# Patient Record
Sex: FEMALE | Race: ASIAN | URBAN - METROPOLITAN AREA
[De-identification: names, ages, dates, MRNs, and addresses within clinical notes are randomized per-mention and may not be internally consistent; named-entity substitution may affect disease eponyms.]

---

## 2017-02-11 PROBLEM — R42 VERTIGO: Status: ACTIVE | Noted: 2017-02-11

## 2017-02-11 PROBLEM — Z78.9 VEGETARIAN: Status: ACTIVE | Noted: 2017-02-11

## 2017-02-11 PROCEDURE — 82746 ASSAY OF FOLIC ACID SERUM: CPT | Performed by: INTERNAL MEDICINE

## 2017-02-11 PROCEDURE — 82607 VITAMIN B-12: CPT | Performed by: INTERNAL MEDICINE

## 2017-09-08 PROBLEM — E03.9 ACQUIRED HYPOTHYROIDISM: Status: ACTIVE | Noted: 2017-09-08

## 2019-04-19 PROCEDURE — 88175 CYTOPATH C/V AUTO FLUID REDO: CPT | Performed by: INTERNAL MEDICINE

## 2021-05-04 LAB
ABO + RH BLD: NORMAL
BLD GP AB SCN SERPL QL GEL: NEGATIVE
HBV SURFACE AG SER QL: NEGATIVE
HIV 1+2 AB+HIV1 P24 AG SERPL QL IA: NEGATIVE
RPR SER QL: NON REACTIVE
RUBV IGG SERPL IA-ACNC: NORMAL

## 2021-06-09 ENCOUNTER — ULTRASOUND ENCOUNTER (OUTPATIENT)
Dept: PERINATAL CARE | Facility: HOSPITAL | Age: 36
End: 2021-06-09
Attending: OBSTETRICS & GYNECOLOGY
Payer: COMMERCIAL

## 2021-06-09 VITALS
WEIGHT: 167 LBS | HEIGHT: 60 IN | HEART RATE: 94 BPM | BODY MASS INDEX: 32.79 KG/M2 | DIASTOLIC BLOOD PRESSURE: 69 MMHG | SYSTOLIC BLOOD PRESSURE: 102 MMHG

## 2021-06-09 DIAGNOSIS — O99.282 HYPOTHYROIDISM AFFECTING PREGNANCY IN SECOND TRIMESTER: ICD-10-CM

## 2021-06-09 DIAGNOSIS — O09.522 MULTIGRAVIDA OF ADVANCED MATERNAL AGE IN SECOND TRIMESTER: ICD-10-CM

## 2021-06-09 DIAGNOSIS — O99.212 OBESITY AFFECTING PREGNANCY IN SECOND TRIMESTER: ICD-10-CM

## 2021-06-09 DIAGNOSIS — E03.9 HYPOTHYROIDISM AFFECTING PREGNANCY IN SECOND TRIMESTER: ICD-10-CM

## 2021-06-09 DIAGNOSIS — O09.522 MULTIGRAVIDA OF ADVANCED MATERNAL AGE IN SECOND TRIMESTER: Primary | ICD-10-CM

## 2021-06-09 PROCEDURE — 76811 OB US DETAILED SNGL FETUS: CPT | Performed by: OBSTETRICS & GYNECOLOGY

## 2021-06-09 PROCEDURE — 99243 OFF/OP CNSLTJ NEW/EST LOW 30: CPT | Performed by: OBSTETRICS & GYNECOLOGY

## 2021-06-09 NOTE — PROGRESS NOTES
[facility]    Dear Dr. Maeve Barajas    Thank you for requesting ultrasound evaluation and maternal fetal medicine consultation on your patient Bia Damico.   As you are aware she is a 30year old female  with a sin Mental Status: She is alert and oriented to person, place, and time. Psychiatric:         Mood and Affect: Mood normal.         Behavior: Behavior normal.         Thought Content:  Thought content normal.         Judgment: Judgment normal.   Vitals and more than adequate to assess for gestational diabetes and preeclampsia; hence, no further significant alterations in obstetric care are advised.     Medical Complications    Women 28years of age or older can expect to experience two to three fold higher ra women 35-39 years and at 26 weeks for women 40 years and older. Fetal Malformations    Cardiac malformations, clubfoot, and diaphragmatic hernia appear to occur with increased frequency in offspring of older women.  These abnormalities are structural and continue with her negative first trimester serum screen. If they want to pursue a noninvasive prenatal screen, they could do that as well. They declined invasive testing.     OBESITY:  Her BMI prior to pregnancy was 33  Obesity during pregnancy is associa weight and BMI are independent risk factors for preeclampsia.              Studies have found that the increased risk of  birth in obese gravidas is primarily associated with obesity-related medical and  complications, rather than an intrins risk of the disease. As opposed to higher dose aspirin therapy, low-dose aspirin (60 to 150 mg per day) diminishes platelet thromboxane synthesis while maintaining vascular wall prostacyclin synthesis.  Although not well studied, the beneficial effect of lo high risk is subjective and determined case by case, as the data describing the magnitude of the association between each of these risk factors and development of preeclampsia vary widely and lack consistency.  USPSTF criteria for moderate risk include:  · gastroschisis). Early therapy (before 16 weeks) may be important since the pathophysiologic features of preeclampsia develop at this time, weeks before clinical disease is apparent.  However, available evidence is inconsistent about the importance of early and gestational hypertension  ? Placental abruption  ? Nonreassuring fetal heart rate tracing  ?  delivery, including very  delivery (before 32 weeks)  ? Low birth weight  ? Increased rate of  section  ?  morbidity and mortality  ? GDMA1  · Hypothyroidism    RECOMMENDATIONS:  · Continue care with Dr. Angela Arriaga TSH every 4 to 6 weeks  Follow-up Growth ultrasound at 32 weeks. Weekly NST's at 36 weeks.   Undecided on aspirin therapy to prevent preeclampsia  She is considering NIPT

## 2021-08-25 ENCOUNTER — OFFICE VISIT (OUTPATIENT)
Dept: PERINATAL CARE | Facility: HOSPITAL | Age: 36
End: 2021-08-25
Attending: OBSTETRICS & GYNECOLOGY
Payer: COMMERCIAL

## 2021-08-25 VITALS
WEIGHT: 176 LBS | DIASTOLIC BLOOD PRESSURE: 65 MMHG | HEART RATE: 94 BPM | BODY MASS INDEX: 34 KG/M2 | SYSTOLIC BLOOD PRESSURE: 102 MMHG

## 2021-08-25 DIAGNOSIS — O09.522 MULTIGRAVIDA OF ADVANCED MATERNAL AGE IN SECOND TRIMESTER: Primary | ICD-10-CM

## 2021-08-25 DIAGNOSIS — O99.282 HYPOTHYROIDISM AFFECTING PREGNANCY IN SECOND TRIMESTER: ICD-10-CM

## 2021-08-25 DIAGNOSIS — E03.9 HYPOTHYROIDISM AFFECTING PREGNANCY IN SECOND TRIMESTER: ICD-10-CM

## 2021-08-25 DIAGNOSIS — E66.9 CLASS 1 OBESITY WITH BODY MASS INDEX (BMI) OF 34.0 TO 34.9 IN ADULT, UNSPECIFIED OBESITY TYPE, UNSPECIFIED WHETHER SERIOUS COMORBIDITY PRESENT: ICD-10-CM

## 2021-08-25 DIAGNOSIS — O99.212 OBESITY AFFECTING PREGNANCY IN SECOND TRIMESTER: ICD-10-CM

## 2021-08-25 DIAGNOSIS — O09.522 MULTIGRAVIDA OF ADVANCED MATERNAL AGE IN SECOND TRIMESTER: ICD-10-CM

## 2021-08-25 DIAGNOSIS — O24.410 DIET CONTROLLED GESTATIONAL DIABETES MELLITUS (GDM) IN THIRD TRIMESTER: ICD-10-CM

## 2021-08-25 PROCEDURE — 76816 OB US FOLLOW-UP PER FETUS: CPT | Performed by: OBSTETRICS & GYNECOLOGY

## 2021-08-25 PROCEDURE — 76819 FETAL BIOPHYS PROFIL W/O NST: CPT

## 2021-08-25 PROCEDURE — 99243 OFF/OP CNSLTJ NEW/EST LOW 30: CPT | Performed by: OBSTETRICS & GYNECOLOGY

## 2021-08-25 PROCEDURE — 76819 FETAL BIOPHYS PROFIL W/O NST: CPT | Performed by: OBSTETRICS & GYNECOLOGY

## 2021-08-25 NOTE — PROGRESS NOTES
Chely Borja    Dear Dr. Krystal Rivera    Thank you for requesting ultrasound evaluation and maternal fetal medicine consultation on your patient Tash Cummins.   As you are aware she is a 29year old female  with a single Complete Ultrasound Report  I interpreted the results and reviewed them with the patient.     DISCUSSION  During her visit we discussed and reviewed the following issues:  AMA–please see previous MFM discussion    Obesity–please see previous MFM discussion 95  Postprandial less than 120 except few outliers of 120. IMPRESSION:  · IUP at 32w1d  · AMA-first trimester serum screen low risk in [facility], considering NIPT.  Declines invasive testing  · Obesity  · Hx GDMA1  · Hypothyroidism  · GDMA1     RECOMMENDATIO

## 2021-09-07 ENCOUNTER — TELEPHONE (OUTPATIENT)
Dept: PERINATAL CARE | Facility: HOSPITAL | Age: 36
End: 2021-09-07

## 2021-09-07 NOTE — TELEPHONE ENCOUNTER
34w0d  Received BS log for date range 8/26-9/7     Low  High Out of Range   Fasting Blood Sugar 82 93 0 out of 12   Post Breakfast 83 122 2 out of 12   Post Lunch 80 123 4 out of 12   Post Dinner 84 120 3 out of 11     Patient is currently diet-controlled.

## 2021-09-07 NOTE — TELEPHONE ENCOUNTER
Encourage her to continue closely counting her carbs and noting which carbs lead to BS of 120 or greater. Review that short walks can also low the BS slightly. No medications at this time.

## 2021-09-17 ENCOUNTER — TELEPHONE (OUTPATIENT)
Dept: PERINATAL CARE | Facility: HOSPITAL | Age: 36
End: 2021-09-17

## 2021-09-17 NOTE — TELEPHONE ENCOUNTER
35w3d  Received BS log for date range 9/11-9/16     Low  High Out of Range   Fasting Blood Sugar 81 86 0 out of 6    Post Breakfast 105 120 1 out of 6   Post Lunch 99 121 2 out of 6   Post Dinner 103 125 3 out of 6     Patient is currently taking  DIET  To

## 2021-09-17 NOTE — TELEPHONE ENCOUNTER
Please have her continue on the diet. She is going slightly high after the lunch and dinner meal but just barely above the target. Encouraged her to keep up the strong work she only has a few weeks to go till the delivery.

## 2021-09-21 LAB
GBS EXTERNAL: NEGATIVE
HIV 1+2 AB+HIV1 P24 AG SERPL QL IA: NEGATIVE

## 2021-09-22 ENCOUNTER — ULTRASOUND ENCOUNTER (OUTPATIENT)
Dept: PERINATAL CARE | Facility: HOSPITAL | Age: 36
End: 2021-09-22
Attending: OBSTETRICS & GYNECOLOGY
Payer: COMMERCIAL

## 2021-09-22 VITALS
SYSTOLIC BLOOD PRESSURE: 106 MMHG | BODY MASS INDEX: 35.73 KG/M2 | WEIGHT: 182 LBS | HEART RATE: 97 BPM | HEIGHT: 60 IN | DIASTOLIC BLOOD PRESSURE: 70 MMHG

## 2021-09-22 DIAGNOSIS — O99.282 HYPOTHYROIDISM AFFECTING PREGNANCY IN SECOND TRIMESTER: ICD-10-CM

## 2021-09-22 DIAGNOSIS — E03.9 HYPOTHYROIDISM AFFECTING PREGNANCY IN SECOND TRIMESTER: ICD-10-CM

## 2021-09-22 DIAGNOSIS — O09.522 MULTIGRAVIDA OF ADVANCED MATERNAL AGE IN SECOND TRIMESTER: ICD-10-CM

## 2021-09-22 DIAGNOSIS — O09.522 MULTIGRAVIDA OF ADVANCED MATERNAL AGE IN SECOND TRIMESTER: Primary | ICD-10-CM

## 2021-09-22 DIAGNOSIS — O99.212 OBESITY AFFECTING PREGNANCY IN SECOND TRIMESTER: ICD-10-CM

## 2021-09-22 DIAGNOSIS — O24.410 DIET CONTROLLED GESTATIONAL DIABETES MELLITUS (GDM) IN THIRD TRIMESTER: ICD-10-CM

## 2021-09-22 PROCEDURE — 76819 FETAL BIOPHYS PROFIL W/O NST: CPT | Performed by: OBSTETRICS & GYNECOLOGY

## 2021-09-22 PROCEDURE — 76816 OB US FOLLOW-UP PER FETUS: CPT | Performed by: OBSTETRICS & GYNECOLOGY

## 2021-09-22 PROCEDURE — 99212 OFFICE O/P EST SF 10 MIN: CPT | Performed by: OBSTETRICS & GYNECOLOGY

## 2021-09-22 PROCEDURE — 76820 UMBILICAL ARTERY ECHO: CPT | Performed by: OBSTETRICS & GYNECOLOGY

## 2021-09-22 NOTE — PROGRESS NOTES
John Lowry    Dear Dr. Kateryna Lomax    Thank you for requesting ultrasound evaluation and maternal fetal medicine consultation on your patient Luan Levin.   As you are aware she is a 29year old female  with a single Complete Ultrasound Report  I interpreted the results and reviewed them with the patient.     DISCUSSION  During her visit we discussed and reviewed the following issues:  AMA–please see previous MFM discussion     Obesity–please see previous MFM discussion concerns arise. Elaine Cobian MD, M.D. The majority of the time (>50%) was spent in review of records, consultation and coordination of care. Our discussion is summarized above. The approximate physician face-to-face time was 20 minutes.

## 2021-10-01 ENCOUNTER — TELEPHONE (OUTPATIENT)
Dept: PERINATAL CARE | Facility: HOSPITAL | Age: 36
End: 2021-10-01

## 2021-10-01 NOTE — TELEPHONE ENCOUNTER
37w3d  Received BS log for date range 9/26-9/30     Low  High Out of Range   Fasting Blood Sugar 82 87 0 out of 5   Post Breakfast 108 125 1 out of 5   Post Lunch 106 120 1 out of 5   Post Dinner 118 125 4 out of 5     Patient is currently diet-controlled.

## 2021-10-07 DIAGNOSIS — Z01.812 PRE-PROCEDURE LAB EXAM: Primary | ICD-10-CM

## 2021-10-08 ENCOUNTER — TELEPHONE (OUTPATIENT)
Dept: PERINATAL CARE | Facility: HOSPITAL | Age: 36
End: 2021-10-08

## 2021-10-08 NOTE — TELEPHONE ENCOUNTER
38w3d  Received BS log for date range 10/2-10/7     Low  High Out of Range   Fasting Blood Sugar 82 87 0/5   Post Breakfast 105 125 1/5   Post Lunch 86 120 2/5   Post Dinner 120 125 4/4     Patient is currently diet controlled. To Dr. Kayla Dominguez for review.

## 2021-10-12 ENCOUNTER — LAB SERVICES (OUTPATIENT)
Dept: LAB | Age: 36
End: 2021-10-12

## 2021-10-12 DIAGNOSIS — Z01.812 PRE-PROCEDURE LAB EXAM: ICD-10-CM

## 2021-10-12 PROCEDURE — U0003 INFECTIOUS AGENT DETECTION BY NUCLEIC ACID (DNA OR RNA); SEVERE ACUTE RESPIRATORY SYNDROME CORONAVIRUS 2 (SARS-COV-2) (CORONAVIRUS DISEASE [COVID-19]), AMPLIFIED PROBE TECHNIQUE, MAKING USE OF HIGH THROUGHPUT TECHNOLOGIES AS DESCRIBED BY CMS-2020-01-R: HCPCS | Performed by: OBSTETRICS & GYNECOLOGY

## 2021-10-12 PROCEDURE — U0005 INFEC AGEN DETEC AMPLI PROBE: HCPCS | Performed by: OBSTETRICS & GYNECOLOGY

## 2021-10-13 LAB
SARS-COV-2 RNA RESP QL NAA+PROBE: NOT DETECTED
SERVICE CMNT-IMP: NORMAL
SERVICE CMNT-IMP: NORMAL

## 2021-10-14 ENCOUNTER — ANESTHESIA (OUTPATIENT)
Dept: OBGYN | Age: 36
End: 2021-10-14

## 2021-10-14 ENCOUNTER — HOSPITAL ENCOUNTER (INPATIENT)
Age: 36
LOS: 1 days | Discharge: HOME | End: 2021-10-15
Attending: OBSTETRICS & GYNECOLOGY | Admitting: OBSTETRICS & GYNECOLOGY

## 2021-10-14 ENCOUNTER — ANESTHESIA EVENT (OUTPATIENT)
Dept: OBGYN | Age: 36
End: 2021-10-14

## 2021-10-14 LAB
ABO + RH BLD: NORMAL
BASE EXCESS / DEFICIT, VENOUS CORD BLOOD - RESPIRATORY: -3 MMOL/L
BLD GP AB SCN SERPL QL GEL: NEGATIVE
CONDITION: ABNORMAL
DEPRECATED RDW RBC: 45.9 FL (ref 39–50)
ERYTHROCYTE [DISTWIDTH] IN BLOOD: 13.7 % (ref 11–15)
GLUCOSE BLDC GLUCOMTR-MCNC: 105 MG/DL (ref 70–99)
GLUCOSE BLDC GLUCOMTR-MCNC: 112 MG/DL (ref 70–99)
HCO3 BLDCOV-SCNC: 21 MMOL/L (ref 22–29)
HCT VFR BLD CALC: 34.2 % (ref 36–46.5)
HGB BLD-MCNC: 11.6 G/DL (ref 12–15.5)
MCH RBC QN AUTO: 31.2 PG (ref 26–34)
MCHC RBC AUTO-ENTMCNC: 33.9 G/DL (ref 32–36.5)
MCV RBC AUTO: 91.9 FL (ref 78–100)
NRBC BLD MANUAL-RTO: 0 /100 WBC
PCO2 BLDCOV: 34 MM HG (ref 23–49)
PH BLDCOV: 7.4 UNITS (ref 7.25–7.45)
PLATELET # BLD AUTO: 204 K/MCL (ref 140–450)
PO2 BLDCOV: 33 MM HG (ref 17–41)
RBC # BLD: 3.72 MIL/MCL (ref 4–5.2)
TYPE AND SCREEN EXPIRATION DATE: NORMAL
WBC # BLD: 8.7 K/MCL (ref 4.2–11)

## 2021-10-14 PROCEDURE — 0KQM0ZZ REPAIR PERINEUM MUSCLE, OPEN APPROACH: ICD-10-PCS | Performed by: OBSTETRICS & GYNECOLOGY

## 2021-10-14 PROCEDURE — 3E033VJ INTRODUCTION OF OTHER HORMONE INTO PERIPHERAL VEIN, PERCUTANEOUS APPROACH: ICD-10-PCS | Performed by: OBSTETRICS & GYNECOLOGY

## 2021-10-14 PROCEDURE — 10000003 HB ROOM CHARGE WOMEN'S HEALTH

## 2021-10-14 PROCEDURE — 85027 COMPLETE CBC AUTOMATED: CPT | Performed by: OBSTETRICS & GYNECOLOGY

## 2021-10-14 PROCEDURE — 13003251 HB VAGINAL DELIVERY HIGH RISK

## 2021-10-14 PROCEDURE — 10004281 HB COUNTER-STAFF TIME PER 15 MIN

## 2021-10-14 PROCEDURE — 86901 BLOOD TYPING SEROLOGIC RH(D): CPT | Performed by: OBSTETRICS & GYNECOLOGY

## 2021-10-14 PROCEDURE — 10002800 HB RX 250 W HCPCS: Performed by: OBSTETRICS & GYNECOLOGY

## 2021-10-14 PROCEDURE — 10002807 HB RX 258

## 2021-10-14 PROCEDURE — 13001455 HB PERINATAL CARE HIGH RISK

## 2021-10-14 PROCEDURE — 10002801 HB RX 250 W/O HCPCS: Performed by: ANESTHESIOLOGY

## 2021-10-14 PROCEDURE — 82803 BLOOD GASES ANY COMBINATION: CPT

## 2021-10-14 PROCEDURE — 10002800 HB RX 250 W HCPCS

## 2021-10-14 PROCEDURE — 10002807 HB RX 258: Performed by: OBSTETRICS & GYNECOLOGY

## 2021-10-14 PROCEDURE — 13000012 HB ANESTHESIA SPINAL/EPIDURAL IN L&D

## 2021-10-14 PROCEDURE — 10002803 HB RX 637: Performed by: OBSTETRICS & GYNECOLOGY

## 2021-10-14 PROCEDURE — 13000001 HB PHASE II RECOVERY EA 30 MINUTES

## 2021-10-14 PROCEDURE — 10907ZC DRAINAGE OF AMNIOTIC FLUID, THERAPEUTIC FROM PRODUCTS OF CONCEPTION, VIA NATURAL OR ARTIFICIAL OPENING: ICD-10-PCS | Performed by: OBSTETRICS & GYNECOLOGY

## 2021-10-14 RX ORDER — BUPIVACAINE HYDROCHLORIDE 2.5 MG/ML
10 INJECTION, SOLUTION EPIDURAL; INFILTRATION; INTRACAUDAL ONCE
Status: COMPLETED | OUTPATIENT
Start: 2021-10-14 | End: 2021-10-14

## 2021-10-14 RX ORDER — OXYTOCIN/0.9 % SODIUM CHLORIDE 30/500 ML
PLASTIC BAG, INJECTION (ML) INTRAVENOUS
Status: COMPLETED
Start: 2021-10-14 | End: 2021-10-14

## 2021-10-14 RX ORDER — VITAMIN A, VITAMIN C, VITAMIN D-3, VITAMIN E, VITAMIN B-1, VITAMIN B-2, NIACIN, VITAMIN B-6, CALCIUM, IRON, ZINC, COPPER 4000; 120; 400; 22; 1.84; 3; 20; 10; 1; 12; 200; 27; 25; 2 [IU]/1; MG/1; [IU]/1; MG/1; MG/1; MG/1; MG/1; MG/1; MG/1; UG/1; MG/1; MG/1; MG/1; MG/1
1 TABLET ORAL DAILY
Status: DISCONTINUED | OUTPATIENT
Start: 2021-10-15 | End: 2021-10-15 | Stop reason: HOSPADM

## 2021-10-14 RX ORDER — 0.9 % SODIUM CHLORIDE 0.9 %
2 VIAL (ML) INJECTION EVERY 12 HOURS SCHEDULED
Status: DISCONTINUED | OUTPATIENT
Start: 2021-10-14 | End: 2021-10-14

## 2021-10-14 RX ORDER — DEXTROSE, SODIUM CHLORIDE, SODIUM LACTATE, POTASSIUM CHLORIDE, AND CALCIUM CHLORIDE 5; .6; .31; .03; .02 G/100ML; G/100ML; G/100ML; G/100ML; G/100ML
INJECTION, SOLUTION INTRAVENOUS CONTINUOUS
Status: DISCONTINUED | OUTPATIENT
Start: 2021-10-14 | End: 2021-10-14

## 2021-10-14 RX ORDER — IBUPROFEN 600 MG/1
600 TABLET ORAL EVERY 6 HOURS
Status: DISCONTINUED | OUTPATIENT
Start: 2021-10-14 | End: 2021-10-15 | Stop reason: HOSPADM

## 2021-10-14 RX ORDER — VITAMIN A ACETATE, BETA CAROTENE, ASCORBIC ACID, CHOLECALCIFEROL, .ALPHA.-TOCOPHEROL ACETATE, DL-, THIAMINE MONONITRATE, RIBOFLAVIN, NIACINAMIDE, PYRIDOXINE HYDROCHLORIDE, FOLIC ACID, CYANOCOBALAMIN, CALCIUM CARBONATE, FERROUS FUMARATE, ZINC OXIDE, CUPRIC OXIDE 3080; 12; 120; 400; 1; 1.84; 3; 20; 22; 920; 25; 200; 27; 10; 2 [IU]/1; UG/1; MG/1; [IU]/1; MG/1; MG/1; MG/1; MG/1; MG/1; [IU]/1; MG/1; MG/1; MG/1; MG/1; MG/1
1 TABLET, FILM COATED ORAL DAILY
COMMUNITY

## 2021-10-14 RX ORDER — DEXTROSE, SODIUM CHLORIDE, SODIUM LACTATE, POTASSIUM CHLORIDE, AND CALCIUM CHLORIDE 5; .6; .31; .03; .02 G/100ML; G/100ML; G/100ML; G/100ML; G/100ML
INJECTION, SOLUTION INTRAVENOUS
Status: COMPLETED
Start: 2021-10-14 | End: 2021-10-14

## 2021-10-14 RX ORDER — EPHEDRINE SULFATE/0.9% NACL/PF 50 MG/10ML
5 SYRINGE (ML) INTRAVENOUS
Status: DISCONTINUED | OUTPATIENT
Start: 2021-10-14 | End: 2021-10-14

## 2021-10-14 RX ORDER — ONDANSETRON 4 MG/1
4 TABLET, FILM COATED ORAL EVERY 8 HOURS PRN
Status: ON HOLD | COMMUNITY
End: 2021-10-15 | Stop reason: HOSPADM

## 2021-10-14 RX ORDER — LEVOTHYROXINE SODIUM 0.05 MG/1
50 TABLET ORAL
Status: DISCONTINUED | OUTPATIENT
Start: 2021-10-15 | End: 2021-10-15 | Stop reason: HOSPADM

## 2021-10-14 RX ORDER — ACETAMINOPHEN 500 MG
1000 TABLET ORAL EVERY 6 HOURS PRN
Status: DISCONTINUED | OUTPATIENT
Start: 2021-10-14 | End: 2021-10-14

## 2021-10-14 RX ORDER — VITAMIN A, VITAMIN C, VITAMIN D-3, VITAMIN E, VITAMIN B-1, VITAMIN B-2, NIACIN, VITAMIN B-6, CALCIUM, IRON, ZINC, COPPER 4000; 120; 400; 22; 1.84; 3; 20; 10; 1; 12; 200; 27; 25; 2 [IU]/1; MG/1; [IU]/1; MG/1; MG/1; MG/1; MG/1; MG/1; MG/1; UG/1; MG/1; MG/1; MG/1; MG/1
1 TABLET ORAL DAILY
Status: DISCONTINUED | OUTPATIENT
Start: 2021-10-14 | End: 2021-10-14

## 2021-10-14 RX ORDER — IBUPROFEN 600 MG/1
TABLET ORAL
Status: COMPLETED
Start: 2021-10-14 | End: 2021-10-14

## 2021-10-14 RX ORDER — EPHEDRINE SULFATE/0.9% NACL/PF 50 MG/10ML
10 SYRINGE (ML) INTRAVENOUS
Status: DISCONTINUED | OUTPATIENT
Start: 2021-10-14 | End: 2021-10-14

## 2021-10-14 RX ORDER — CALCIUM CARBONATE 500 MG/1
500 TABLET, CHEWABLE ORAL EVERY 4 HOURS PRN
Status: DISCONTINUED | OUTPATIENT
Start: 2021-10-14 | End: 2021-10-14

## 2021-10-14 RX ORDER — ACETAMINOPHEN 325 MG/1
650 TABLET ORAL EVERY 6 HOURS
Status: DISCONTINUED | OUTPATIENT
Start: 2021-10-14 | End: 2021-10-15

## 2021-10-14 RX ORDER — SODIUM CHLORIDE, SODIUM LACTATE, POTASSIUM CHLORIDE, CALCIUM CHLORIDE 600; 310; 30; 20 MG/100ML; MG/100ML; MG/100ML; MG/100ML
INJECTION, SOLUTION INTRAVENOUS CONTINUOUS
Status: DISCONTINUED | OUTPATIENT
Start: 2021-10-14 | End: 2021-10-14

## 2021-10-14 RX ORDER — OXYTOCIN-SODIUM CHLORIDE 0.9% IV SOLN 30 UNIT/500ML 30-0.9/5 UT/ML-%
0-334 SOLUTION INTRAVENOUS CONTINUOUS
Status: DISCONTINUED | OUTPATIENT
Start: 2021-10-14 | End: 2021-10-14

## 2021-10-14 RX ORDER — FAMOTIDINE 10 MG/ML
20 INJECTION, SOLUTION INTRAVENOUS EVERY 12 HOURS PRN
Status: DISCONTINUED | OUTPATIENT
Start: 2021-10-14 | End: 2021-10-14

## 2021-10-14 RX ORDER — OXYTOCIN-SODIUM CHLORIDE 0.9% IV SOLN 30 UNIT/500ML 30-0.9/5 UT/ML-%
0-334 SOLUTION INTRAVENOUS CONTINUOUS
Status: DISCONTINUED | OUTPATIENT
Start: 2021-10-14 | End: 2021-10-15 | Stop reason: HOSPADM

## 2021-10-14 RX ORDER — HYDROCORTISONE ACETATE PRAMOXINE HCL 2.5; 1 G/100G; G/100G
1 CREAM TOPICAL 3 TIMES DAILY PRN
Status: DISCONTINUED | OUTPATIENT
Start: 2021-10-14 | End: 2021-10-15 | Stop reason: HOSPADM

## 2021-10-14 RX ORDER — LEVOTHYROXINE SODIUM 0.05 MG/1
50 TABLET ORAL
Status: DISCONTINUED | OUTPATIENT
Start: 2021-10-15 | End: 2021-10-14

## 2021-10-14 RX ORDER — OXYCODONE HYDROCHLORIDE 5 MG/1
5 TABLET ORAL EVERY 4 HOURS PRN
Status: DISCONTINUED | OUTPATIENT
Start: 2021-10-14 | End: 2021-10-15 | Stop reason: HOSPADM

## 2021-10-14 RX ORDER — CALCIUM CARBONATE 500 MG/1
500 TABLET, CHEWABLE ORAL EVERY 4 HOURS PRN
Status: DISCONTINUED | OUTPATIENT
Start: 2021-10-14 | End: 2021-10-15 | Stop reason: HOSPADM

## 2021-10-14 RX ORDER — ONDANSETRON 2 MG/ML
4 INJECTION INTRAMUSCULAR; INTRAVENOUS 2 TIMES DAILY PRN
Status: DISCONTINUED | OUTPATIENT
Start: 2021-10-14 | End: 2021-10-14

## 2021-10-14 RX ORDER — SIMETHICONE 125 MG
125 TABLET,CHEWABLE ORAL EVERY 4 HOURS PRN
Status: DISCONTINUED | OUTPATIENT
Start: 2021-10-14 | End: 2021-10-15 | Stop reason: HOSPADM

## 2021-10-14 RX ORDER — FERROUS FUMARATE 324(106)MG
TABLET ORAL
Status: ON HOLD | COMMUNITY
End: 2021-10-15 | Stop reason: HOSPADM

## 2021-10-14 RX ORDER — LIDOCAINE HYDROCHLORIDE 10 MG/ML
30 INJECTION, SOLUTION EPIDURAL; INFILTRATION; INTRACAUDAL; PERINEURAL
Status: DISCONTINUED | OUTPATIENT
Start: 2021-10-14 | End: 2021-10-14

## 2021-10-14 RX ORDER — LEVOTHYROXINE SODIUM 0.05 MG/1
50 TABLET ORAL DAILY
COMMUNITY

## 2021-10-14 RX ORDER — OXYTOCIN/0.9 % SODIUM CHLORIDE 30/500 ML
0-25 PLASTIC BAG, INJECTION (ML) INTRAVENOUS CONTINUOUS
Status: DISCONTINUED | OUTPATIENT
Start: 2021-10-14 | End: 2021-10-14

## 2021-10-14 RX ORDER — HEPARIN SOD,PORK IN 0.45% NACL 25000/500
INTRAVENOUS SOLUTION INTRAVENOUS CONTINUOUS
Status: DISCONTINUED | OUTPATIENT
Start: 2021-10-14 | End: 2021-10-14

## 2021-10-14 RX ORDER — ONDANSETRON 2 MG/ML
4 INJECTION INTRAMUSCULAR; INTRAVENOUS 2 TIMES DAILY PRN
Status: DISCONTINUED | OUTPATIENT
Start: 2021-10-14 | End: 2021-10-15 | Stop reason: HOSPADM

## 2021-10-14 RX ADMIN — FENTANYL CITRATE 100 MCG: 50 INJECTION INTRAMUSCULAR; INTRAVENOUS at 11:30

## 2021-10-14 RX ADMIN — FENTANYL CITRATE 100 MCG: 50 INJECTION INTRAMUSCULAR; INTRAVENOUS at 10:00

## 2021-10-14 RX ADMIN — Medication 2 MILLI-UNITS/MIN: at 08:58

## 2021-10-14 RX ADMIN — DEXTROSE, SODIUM CHLORIDE, SODIUM LACTATE, POTASSIUM CHLORIDE, AND CALCIUM CHLORIDE: 5; .6; .31; .03; .02 INJECTION, SOLUTION INTRAVENOUS at 08:55

## 2021-10-14 RX ADMIN — OXYTOCIN-SODIUM CHLORIDE 0.9% IV SOLN 30 UNIT/500ML 2 MILLI-UNITS/MIN: 30-0.9/5 SOLUTION at 08:58

## 2021-10-14 RX ADMIN — SODIUM CHLORIDE, POTASSIUM CHLORIDE, SODIUM LACTATE AND CALCIUM CHLORIDE: 600; 310; 30; 20 INJECTION, SOLUTION INTRAVENOUS at 12:31

## 2021-10-14 RX ADMIN — IBUPROFEN 600 MG: 600 TABLET ORAL at 20:03

## 2021-10-14 RX ADMIN — SODIUM CHLORIDE, POTASSIUM CHLORIDE, SODIUM LACTATE AND CALCIUM CHLORIDE: 600; 310; 30; 20 INJECTION, SOLUTION INTRAVENOUS at 11:36

## 2021-10-14 RX ADMIN — ONDANSETRON 4 MG: 2 INJECTION INTRAMUSCULAR; INTRAVENOUS at 10:01

## 2021-10-14 RX ADMIN — BUPIVACAINE HYDROCHLORIDE 6 ML: 2.5 INJECTION, SOLUTION EPIDURAL; INFILTRATION; INTRACAUDAL; PERINEURAL at 12:39

## 2021-10-14 RX ADMIN — Medication 10 ML/HR: at 12:39

## 2021-10-14 ASSESSMENT — LIFESTYLE VARIABLES
HOW OFTEN DO YOU HAVE A DRINK CONTAINING ALCOHOL: NEVER
HOW OFTEN DO YOU HAVE 6 OR MORE DRINKS ON ONE OCCASION: NEVER
IS PATIENT ABLE TO COMPLETE ASSESSMENT AT THIS TIME: YES
HISTORY OF PROBLEMS WHEN YOU STOP DRINKING ALCOHOL: NO
SHORT OF BREATH OR FATIGUE WITH ADLS: NO
ADL NEEDS ASSIST: NO
ARE YOU BLIND OR DO YOU HAVE SERIOUS DIFFICULTY SEEING, EVEN WHEN WEARING GLASSES: NO
LAST DRINK YOU HAD: DENIES INTAKE
ADL BEFORE ADMISSION: INDEPENDENT
ALCOHOL_USE_STATUS: NO OR LOW RISK WITH VALIDATED TOOL
ARE YOU DEAF OR DO YOU HAVE SERIOUS DIFFICULTY  HEARING: NO
SMOKING_YEARS: NO
CHRONIC/CANCER PAIN PRESENT: NO

## 2021-10-14 ASSESSMENT — PATIENT HEALTH QUESTIONNAIRE - PHQ9
CLINICAL INTERPRETATION OF PHQ2 SCORE: NO FURTHER SCREENING NEEDED
CLINICAL INTERPRETATION OF PHQ9 SCORE: NO FURTHER SCREENING NEEDED
IS PATIENT ABLE TO COMPLETE PHQ2 OR PHQ9: YES
SUM OF ALL RESPONSES TO PHQ9 QUESTIONS 1 AND 2: 0

## 2021-10-14 ASSESSMENT — PAIN SCALES - GENERAL
PAINLEVEL_OUTOF10: 5
PAINLEVEL_OUTOF10: 2
PAINLEVEL: 1
PAINLEVEL_OUTOF10: 0

## 2021-10-14 ASSESSMENT — ACTIVITIES OF DAILY LIVING (ADL): ADL_SCORE: 12

## 2021-10-15 VITALS
DIASTOLIC BLOOD PRESSURE: 63 MMHG | WEIGHT: 185 LBS | SYSTOLIC BLOOD PRESSURE: 99 MMHG | RESPIRATION RATE: 16 BRPM | HEIGHT: 60 IN | TEMPERATURE: 98.2 F | BODY MASS INDEX: 36.32 KG/M2 | HEART RATE: 86 BPM | OXYGEN SATURATION: 99 %

## 2021-10-15 LAB
DEPRECATED RDW RBC: 46.1 FL (ref 39–50)
ERYTHROCYTE [DISTWIDTH] IN BLOOD: 13.8 % (ref 11–15)
HCT VFR BLD CALC: 26.7 % (ref 36–46.5)
HGB BLD-MCNC: 9 G/DL (ref 12–15.5)
MCH RBC QN AUTO: 31.5 PG (ref 26–34)
MCHC RBC AUTO-ENTMCNC: 33.7 G/DL (ref 32–36.5)
MCV RBC AUTO: 93.4 FL (ref 78–100)
NRBC BLD MANUAL-RTO: 0 /100 WBC
PLATELET # BLD AUTO: 168 K/MCL (ref 140–450)
RBC # BLD: 2.86 MIL/MCL (ref 4–5.2)
WBC # BLD: 12.6 K/MCL (ref 4.2–11)

## 2021-10-15 PROCEDURE — 10002803 HB RX 637: Performed by: OBSTETRICS & GYNECOLOGY

## 2021-10-15 PROCEDURE — 36415 COLL VENOUS BLD VENIPUNCTURE: CPT | Performed by: OBSTETRICS & GYNECOLOGY

## 2021-10-15 PROCEDURE — S9445 PT EDUCATION NOC INDIVID: HCPCS

## 2021-10-15 PROCEDURE — 85027 COMPLETE CBC AUTOMATED: CPT | Performed by: OBSTETRICS & GYNECOLOGY

## 2021-10-15 RX ORDER — IBUPROFEN 600 MG/1
600 TABLET ORAL EVERY 6 HOURS
Status: SHIPPED | COMMUNITY
Start: 2021-10-15

## 2021-10-15 RX ORDER — ACETAMINOPHEN 325 MG/1
650 TABLET ORAL EVERY 6 HOURS PRN
Status: DISCONTINUED | OUTPATIENT
Start: 2021-10-15 | End: 2021-10-15 | Stop reason: HOSPADM

## 2021-10-15 RX ORDER — ACETAMINOPHEN 325 MG/1
650 TABLET ORAL EVERY 6 HOURS PRN
Status: SHIPPED | COMMUNITY
Start: 2021-10-15

## 2021-10-15 RX ADMIN — IBUPROFEN 600 MG: 600 TABLET ORAL at 02:05

## 2021-10-15 RX ADMIN — VITAMIN A, VITAMIN C, VITAMIN D, VITAMIN E, THIAMINE, RIBOFLAVIN, NIACIN, VITAMIN B6, FOLIC ACID, VITAMIN B12, CALCIUM, IRON, ZINC, COPPER 1 TABLET: 4000; 120; 400; 22; 1.84; 3; 20; 10; 1; 12; 200; 27; 25; 2 TABLET ORAL at 09:15

## 2021-10-15 RX ADMIN — IBUPROFEN 600 MG: 600 TABLET ORAL at 09:15

## 2021-10-15 RX ADMIN — IBUPROFEN 600 MG: 600 TABLET ORAL at 14:59

## 2021-10-15 RX ADMIN — LEVOTHYROXINE SODIUM 50 MCG: 0.05 TABLET ORAL at 06:28

## 2021-10-15 ASSESSMENT — LIFESTYLE VARIABLES
LAST DRINK YOU HAD: DENIES INTAKE
ADL NEEDS ASSIST: NO
ALCOHOL_USE_STATUS: NO OR LOW RISK WITH VALIDATED TOOL
ADL BEFORE ADMISSION: INDEPENDENT
RECENT DECLINE IN ADLS: NO
HOW OFTEN DO YOU HAVE A DRINK CONTAINING ALCOHOL: NEVER
HISTORY OF PROBLEMS WHEN YOU STOP DRINKING ALCOHOL: NO
ARE YOU BLIND OR DO YOU HAVE SERIOUS DIFFICULTY SEEING, EVEN WHEN WEARING GLASSES: NO
HOW OFTEN DO YOU HAVE 6 OR MORE DRINKS ON ONE OCCASION: NEVER
HOW MANY STANDARD DRINKS CONTAINING ALCOHOL DO YOU HAVE ON A TYPICAL DAY: 0,1 OR 2
ARE YOU DEAF OR DO YOU HAVE SERIOUS DIFFICULTY  HEARING: NO
AUDIT-C TOTAL SCORE: 0

## 2021-10-15 ASSESSMENT — COGNITIVE AND FUNCTIONAL STATUS - GENERAL
BECAUSE OF A PHYSICAL, MENTAL, OR EMOTIONAL CONDITION, DO YOU HAVE DIFFICULTY DOING ERRANDS ALONE: NO
BECAUSE OF A PHYSICAL, MENTAL, OR EMOTIONAL CONDITION, DO YOU HAVE SERIOUS DIFFICULTY CONCENTRATING, REMEMBERING OR MAKING DECISIONS: NO
DO YOU HAVE SERIOUS DIFFICULTY WALKING OR CLIMBING STAIRS: NO
DO YOU HAVE DIFFICULTY DRESSING OR BATHING: NO

## 2021-10-15 ASSESSMENT — ACTIVITIES OF DAILY LIVING (ADL)
ADL_SCORE: 12
ADL_SHORT_OF_BREATH: NO

## 2021-10-15 ASSESSMENT — PAIN SCALES - GENERAL
PAINLEVEL_OUTOF10: 3
PAINLEVEL_OUTOF10: 2
PAINLEVEL_OUTOF10: 4
PAINLEVEL_OUTOF10: 2